# Patient Record
Sex: FEMALE | Race: WHITE | Employment: STUDENT | URBAN - METROPOLITAN AREA
[De-identification: names, ages, dates, MRNs, and addresses within clinical notes are randomized per-mention and may not be internally consistent; named-entity substitution may affect disease eponyms.]

---

## 2019-12-06 ENCOUNTER — OFFICE VISIT (OUTPATIENT)
Dept: URGENT CARE | Facility: URGENT CARE | Age: 19
End: 2019-12-06
Payer: COMMERCIAL

## 2019-12-06 ENCOUNTER — ANCILLARY PROCEDURE (OUTPATIENT)
Dept: GENERAL RADIOLOGY | Facility: CLINIC | Age: 19
End: 2019-12-06
Attending: FAMILY MEDICINE
Payer: COMMERCIAL

## 2019-12-06 VITALS
OXYGEN SATURATION: 99 % | TEMPERATURE: 100.1 F | WEIGHT: 155 LBS | DIASTOLIC BLOOD PRESSURE: 68 MMHG | HEIGHT: 69 IN | RESPIRATION RATE: 14 BRPM | BODY MASS INDEX: 22.96 KG/M2 | HEART RATE: 105 BPM | SYSTOLIC BLOOD PRESSURE: 102 MMHG

## 2019-12-06 DIAGNOSIS — R05.9 COUGH: ICD-10-CM

## 2019-12-06 DIAGNOSIS — J18.9 PNEUMONIA OF RIGHT LOWER LOBE DUE TO INFECTIOUS ORGANISM: Primary | ICD-10-CM

## 2019-12-06 DIAGNOSIS — R50.9 FEVER, UNSPECIFIED FEVER CAUSE: ICD-10-CM

## 2019-12-06 LAB
FLUAV+FLUBV AG SPEC QL: NEGATIVE
FLUAV+FLUBV AG SPEC QL: NEGATIVE
SPECIMEN SOURCE: NORMAL

## 2019-12-06 PROCEDURE — 71046 X-RAY EXAM CHEST 2 VIEWS: CPT

## 2019-12-06 PROCEDURE — 87804 INFLUENZA ASSAY W/OPTIC: CPT | Performed by: FAMILY MEDICINE

## 2019-12-06 PROCEDURE — 99203 OFFICE O/P NEW LOW 30 MIN: CPT | Performed by: FAMILY MEDICINE

## 2019-12-06 RX ORDER — NORGESTIMATE AND ETHINYL ESTRADIOL 7DAYSX3 28
KIT ORAL
Refills: 2 | COMMUNITY
Start: 2019-11-08

## 2019-12-06 RX ORDER — DOXYCYCLINE HYCLATE 100 MG
100 TABLET ORAL 2 TIMES DAILY
Qty: 14 TABLET | Refills: 0 | Status: SHIPPED | OUTPATIENT
Start: 2019-12-06 | End: 2019-12-13

## 2019-12-06 ASSESSMENT — MIFFLIN-ST. JEOR: SCORE: 1542.46

## 2019-12-06 NOTE — PROGRESS NOTES
"SUBJECTIVE:  Beti is a 18 year old female who presents to urgent care with cough, fever, malaise.  Has been feeling ill for 8d.  Started with cough and rhinorrhea and fever.   Now fever and cough, hurts to take a deep breath under her right breast. Feels unbelievably run down. Coughs all night. Can't get comfortable, ribs ache.   No sick contacts  Fevers come and go, 101-102.   Does have bodyaches and chills.   No facial pressure.     She is healthy, does not smoke. Only med is birth control.       OBJECTIVE:  /68   Pulse 105   Temp 100.1  F (37.8  C) (Oral)   Resp 14   Ht 1.753 m (5' 9\")   Wt 70.3 kg (155 lb)   LMP 11/29/2019   SpO2 99%   Breastfeeding No   BMI 22.89 kg/m     GENERAL APPEARANCE: mildly ill appearing, alert and no distress  Card: RRR, no murmur, normal S1/S2, no LE swelling.  Resp: crackles in the Right middle and lower lung fields. Otherwise clear, no wheeze.   HEENT: Head - normocephalic, atraumatic   Eyes - normal lids and conjuntivae, EOMs intact   Nose - no deformity, without masses, no rhinorrhea  Oropharynx - Oral mucosa and pharnyx normal, moist mucous membranes   Ears: TMs normal bilaterally, normal canals.     DIAGNOSTICS    Results for orders placed or performed in visit on 12/06/19   Influenza A/B antigen     Status: None   Result Value Ref Range    Influenza A/B Agn Specimen Nasal     Influenza A Negative NEG^Negative    Influenza B Negative NEG^Negative      CXR: on my personal interpretation, there is an infiltrate in the Right lower lobe suggestive of pneumonia.     ASSESSMENT/PLAN:  Beti was seen today for urgent care and uri.    Diagnoses and all orders for this visit:    Pneumonia of right lower lobe due to infectious organism (H): over a week of febrile cough, crackles on exam so we did an xray. This revealed a RLL pneumonia. Will treat with doxycycline. Discussed concerning symptoms for which to return to urgent care or the ED.  -     doxycycline hyclate " (VIBRA-TABS) 100 MG tablet; Take 1 tablet (100 mg) by mouth 2 times daily for 7 days    Cough  -     XR Chest 2 Views; Future  -     Influenza A/B antigen    Fever, unspecified fever cause  -     XR Chest 2 Views; Future  -     Influenza A/B antigen        The plan of care was discussed with the Patient. They understand and agree with the course of treatment prescribed. A printed summary was given including instructions and medications.      Jessica Burgos MD  Family Medicine

## 2019-12-06 NOTE — PATIENT INSTRUCTIONS
Patient Education     Pneumonia (Adult)  Pneumonia is an infection deep within the lungs. It is in the small air sacs (alveoli). Pneumonia may be caused by a virus or bacteria. Pneumonia caused by bacteria is usually treated with an antibiotic. Severe cases may need to be treated in the hospital. Milder cases can be treated at home. Symptoms usually start to get better during the first 2 days of treatment.    Home care  Follow these guidelines when caring for yourself at home:    Rest at home for the first 2 to 3 days, or until you feel stronger. Don t let yourself get overly tired when you go back to your activities.    Stay away from cigarette smoke - yours or other people s.    You may use acetaminophen or ibuprofen to control fever or pain, unless another medicine was prescribed. If you have chronic liver or kidney disease, talk with your healthcare provider before using these medicines. Also talk with your provider if you ve had a stomach ulcer or gastrointestinal bleeding. Don t give aspirin to anyone younger than 18 years of age who is ill with a fever. It may cause severe liver damage.    Your appetite may be poor, so a light diet is fine.    Drink 6 to 8 glasses of fluids every day to make sure you are getting enough fluids. Beverages can include water, sport drinks, sodas without caffeine, juices, tea, or soup. Fluids will help loosen secretions in the lung. This will make it easier for you to cough up the phlegm (sputum). If you also have heart or kidney disease, check with your healthcare provider before you drink extra fluids.    Take antibiotic medicine prescribed until it is all gone, even if you are feeling better after a few days.  Follow-up care  Follow up with your healthcare provider in the next 2 to 3 days, or as advised. This is to be sure the medicine is helping you get better.  If you are 65 or older, you should get a pneumococcal vaccine and a yearly flu (influenza) shot. You should also  get these vaccines if you have chronic lung disease like asthma, emphysema, or COPD. Recently, a second type of pneumonia vaccine has become available for everyone over 65 years old. This is in addition to the previous vaccine. Ask your provider about this.  When to seek medical advice  Call your healthcare provider right away if any of these occur:    You don t get better within the first 48 hours of treatment    Shortness of breath gets worse    Rapid breathing (more than 25 breaths per minute)    Coughing up blood    Chest pain gets worse with breathing    Fever of 100.4 F (38 C) or higher that doesn t get better with fever medicine    Weakness, dizziness, or fainting that gets worse    Thirst or dry mouth that gets worse    Sinus pain, headache, or a stiff neck    Chest pain not caused by coughing  Date Last Reviewed: 1/1/2017 2000-2018 [employer]. [address] [place] [address], [place] [address]. All rights reserved. This information is not intended as a substitute for professional medical care. Always follow your healthcare professional's instructions.

## 2019-12-09 ENCOUNTER — TELEPHONE (OUTPATIENT)
Dept: URGENT CARE | Facility: URGENT CARE | Age: 19
End: 2019-12-09

## 2019-12-10 NOTE — TELEPHONE ENCOUNTER
"Clinic Action Needed: YES, please check with MD and call pt tonight at [phone]  Reason for Call:\"I was seen at  and given   doxycycline hyclate (VIBRA-TABS) 100 MG tablet; Take 1 tablet (100 mg) by mouth 2 times daily for 7 days for pneumonia. Today I am noticing itchy hives all over my back.\" Denies other sx  Patient Recommendations/Teaching:Will route message to JAMIA/MD  Routed to:JAMIA Linton RN [facility]          "

## 2019-12-12 NOTE — TELEPHONE ENCOUNTER
Left detailed message on pt's voicemail informing her to stop doxycycline and call us to let us know where to send her replacement Rx of azithromycin 500 mg daily x 3 days. Called in to [facility] pharmacy per her request; she will  tomorrow. - Viviana Basurto RN